# Patient Record
Sex: FEMALE | Race: BLACK OR AFRICAN AMERICAN | NOT HISPANIC OR LATINO | URBAN - METROPOLITAN AREA
[De-identification: names, ages, dates, MRNs, and addresses within clinical notes are randomized per-mention and may not be internally consistent; named-entity substitution may affect disease eponyms.]

---

## 2020-11-09 PROBLEM — Z00.00 ENCOUNTER FOR PREVENTIVE HEALTH EXAMINATION: Status: ACTIVE | Noted: 2020-11-09

## 2020-11-12 ENCOUNTER — OUTPATIENT (OUTPATIENT)
Dept: OUTPATIENT SERVICES | Facility: HOSPITAL | Age: 60
LOS: 1 days | End: 2020-11-12
Payer: MEDICAID

## 2020-11-12 ENCOUNTER — APPOINTMENT (OUTPATIENT)
Dept: CARDIOLOGY | Facility: HOSPITAL | Age: 60
End: 2020-11-12

## 2020-11-12 ENCOUNTER — NON-APPOINTMENT (OUTPATIENT)
Age: 60
End: 2020-11-12

## 2020-11-12 VITALS
OXYGEN SATURATION: 99 % | DIASTOLIC BLOOD PRESSURE: 90 MMHG | BODY MASS INDEX: 22.5 KG/M2 | HEART RATE: 73 BPM | SYSTOLIC BLOOD PRESSURE: 151 MMHG | WEIGHT: 140 LBS | HEIGHT: 66 IN

## 2020-11-12 VITALS — SYSTOLIC BLOOD PRESSURE: 146 MMHG | DIASTOLIC BLOOD PRESSURE: 83 MMHG

## 2020-11-12 DIAGNOSIS — I25.10 ATHEROSCLEROTIC HEART DISEASE OF NATIVE CORONARY ARTERY WITHOUT ANGINA PECTORIS: ICD-10-CM

## 2020-11-12 DIAGNOSIS — Z82.49 FAMILY HISTORY OF ISCHEMIC HEART DISEASE AND OTHER DISEASES OF THE CIRCULATORY SYSTEM: ICD-10-CM

## 2020-11-12 PROCEDURE — 93005 ELECTROCARDIOGRAM TRACING: CPT

## 2020-11-12 PROCEDURE — G0463: CPT

## 2020-11-12 NOTE — ASSESSMENT
[FreeTextEntry1] : 60 year old woman with history of premature CAD (sister with MI at age 56) and sickle cell trait who presents for evaluation. She is asymptomatic and does not have history of smoking, DM, or HTN (although blood pressure slightly high today).\par \par #FHx of premature CAD\par -obtain CT coronaries to evaluate for CAD\par -reported relatively normal lipid panel 1 week ago, advised to bring in or fax over outpatient MD Dr. Ji's blood test results, otherwise will repeat lipid panel, a1c with BMP prior to CT\par -discussed importance of monitoring for symptoms of chest pain or shortness of breath\par \par #HTN, single reading, likely related to recent stressors in life (her child's father might be passing soon from cancer)\par -reportedly 110s/60s at outpatient PMD office as well as at home\par -continue BP monitoring at home\par \par RTC after CT coronaries

## 2020-11-12 NOTE — HISTORY OF PRESENT ILLNESS
[FreeTextEntry1] : 60 year old woman with history of premature CAD (sister with MI at age 56) and sickle cell trait who presents for evaluation.\par \par She reports her sister recently had an MI with stent placed -- her sister is 56 years old and is a daily smoker. She otherwise is healthy and exercises regularly (3x week with walking, tennis and weight-lifting). She eats mostly vegetables without red meat. Denies any chest pain, SOB, palpitations, LOC or lightheadedness. She is post-menopausal. Denies any history of high blood pressure or DM.\par \par PMH/PSH: glaucoma\par Meds: Iron tabs\par NKDA\par SH/FH: sister with MI at age 56, no other FHx of CAD. occasionally smokes marijuana. Occ. alcohol. No IVDU or smoking cigarettes\par \par ECG: NSR, minimal nonspecific ST-T wave abnormalities inferolaterally